# Patient Record
Sex: FEMALE | Race: BLACK OR AFRICAN AMERICAN | NOT HISPANIC OR LATINO | Employment: FULL TIME | URBAN - METROPOLITAN AREA
[De-identification: names, ages, dates, MRNs, and addresses within clinical notes are randomized per-mention and may not be internally consistent; named-entity substitution may affect disease eponyms.]

---

## 2017-05-16 ENCOUNTER — OFFICE VISIT (OUTPATIENT)
Dept: URGENT CARE | Facility: PHYSICIAN GROUP | Age: 22
End: 2017-05-16
Payer: COMMERCIAL

## 2017-05-16 VITALS
HEIGHT: 65 IN | SYSTOLIC BLOOD PRESSURE: 112 MMHG | HEART RATE: 106 BPM | WEIGHT: 142 LBS | BODY MASS INDEX: 23.66 KG/M2 | TEMPERATURE: 100.2 F | RESPIRATION RATE: 16 BRPM | OXYGEN SATURATION: 99 % | DIASTOLIC BLOOD PRESSURE: 72 MMHG

## 2017-05-16 DIAGNOSIS — J02.9 SORE THROAT: ICD-10-CM

## 2017-05-16 DIAGNOSIS — H10.33 ACUTE BACTERIAL CONJUNCTIVITIS OF BOTH EYES: ICD-10-CM

## 2017-05-16 DIAGNOSIS — J01.40 ACUTE PANSINUSITIS, RECURRENCE NOT SPECIFIED: ICD-10-CM

## 2017-05-16 LAB
INT CON NEG: NEGATIVE
INT CON POS: POSITIVE
S PYO AG THROAT QL: NEGATIVE

## 2017-05-16 PROCEDURE — 99214 OFFICE O/P EST MOD 30 MIN: CPT | Performed by: NURSE PRACTITIONER

## 2017-05-16 PROCEDURE — 87880 STREP A ASSAY W/OPTIC: CPT | Performed by: NURSE PRACTITIONER

## 2017-05-16 RX ORDER — AZITHROMYCIN 250 MG/1
TABLET, FILM COATED ORAL
Qty: 6 TAB | Refills: 0 | Status: SHIPPED | OUTPATIENT
Start: 2017-05-16

## 2017-05-16 RX ORDER — TOBRAMYCIN 3 MG/ML
1 SOLUTION/ DROPS OPHTHALMIC 4 TIMES DAILY
Qty: 1 BOTTLE | Refills: 0 | Status: SHIPPED | OUTPATIENT
Start: 2017-05-16

## 2017-05-16 ASSESSMENT — ENCOUNTER SYMPTOMS
DIZZINESS: 0
ORTHOPNEA: 0
HEADACHES: 0
SPUTUM PRODUCTION: 0
VOMITING: 0
FEVER: 1
PHOTOPHOBIA: 0
DOUBLE VISION: 0
NECK PAIN: 0
MYALGIAS: 0
CONSTIPATION: 0
WEAKNESS: 0
SORE THROAT: 1
CHILLS: 0
EYE REDNESS: 1
NAUSEA: 0
EYE DISCHARGE: 1
SHORTNESS OF BREATH: 0
COUGH: 1
BLURRED VISION: 0
ABDOMINAL PAIN: 0
PALPITATIONS: 0
WHEEZING: 0
DIARRHEA: 0
EYE PAIN: 0

## 2017-05-16 NOTE — PROGRESS NOTES
Subjective:      Fabiana Elizondo is a 21 y.o. female who presents with Cough            Cough  Associated symptoms include eye redness, a fever and a sore throat. Pertinent negatives include no chest pain, chills, ear pain, headaches, myalgias, shortness of breath or wheezing. There is no history of environmental allergies.   Fabiana is a 21 year old female who is here for dry cough and sinus problems x 1 week. Taking Dayquil severe cold and congestion at night. Able to function during day with cough but present. Denies SOB, chest tightness, asthma or wheeze. States fever and sore throat have improved. No nasal flushing, nasal sprays or salt water gargling. Admits to poor water drinking. C/o nasal congestion without facial/ear pressure but has thick yellow/green nasal d/c. States both eyes are crusty and itchy and swollen with thick eye d/c.     PMH:  has no past medical history on file.  MEDS:   Current outpatient prescriptions:   •  DM-Phenylephrine-Acetaminophen (VICKS DAYQUIL COLD & FLU PO), Take  by mouth., Disp: , Rfl:   •  Ketotifen Fumarate (ALLERGY EYE DROPS OP), by Ophthalmic route., Disp: , Rfl:   •  tobramycin (TOBREX) 0.3 % Solution ophthalmic solution, Place 1 Drop in both eyes 4 times a day., Disp: 1 Bottle, Rfl: 0  •  azithromycin (ZITHROMAX) 250 MG Tab, Take 2 tabs by mouth on day 1, then take 1 tab on days 2-5, Disp: 6 Tab, Rfl: 0  ALLERGIES: No Known Allergies  SURGHX:   Past Surgical History   Procedure Laterality Date   • Tonsillectomy and adenoidectomy  6/10/08     Performed by STEPHAN LY at SURGERY SAME DAY [facility] ORS     SOCHX:  reports that she has never smoked. She has never used smokeless tobacco. She reports that she does not drink alcohol or use illicit drugs.  FH: Family history was reviewed, no pertinent findings to report      Review of Systems   Constitutional: Positive for fever and malaise/fatigue. Negative for chills.   HENT: Positive for congestion and sore throat.  "Negative for ear pain.    Eyes: Positive for discharge and redness. Negative for blurred vision, double vision, photophobia and pain.   Respiratory: Positive for cough. Negative for sputum production, shortness of breath and wheezing.    Cardiovascular: Negative for chest pain, palpitations and orthopnea.   Gastrointestinal: Negative for nausea, vomiting, abdominal pain, diarrhea and constipation.   Musculoskeletal: Negative for myalgias and neck pain.   Neurological: Negative for dizziness, weakness and headaches.   Endo/Heme/Allergies: Negative for environmental allergies.   All other systems reviewed and are negative.         Objective:     /72 mmHg  Pulse 106  Temp(Src) 37.9 °C (100.2 °F)  Resp 16  Ht 1.651 m (5' 5\")  Wt 64.411 kg (142 lb)  BMI 23.63 kg/m2  SpO2 99%     Physical Exam   Constitutional: She is oriented to person, place, and time. She appears well-developed and well-nourished. She is cooperative.  Non-toxic appearance. She does not have a sickly appearance. She appears ill. No distress.   HENT:   Head: Normocephalic.   Right Ear: External ear and ear canal normal. Tympanic membrane is bulging. A middle ear effusion is present.   Left Ear: External ear and ear canal normal. Tympanic membrane is bulging. A middle ear effusion is present.   Nose: Mucosal edema, rhinorrhea and sinus tenderness present. Right sinus exhibits maxillary sinus tenderness and frontal sinus tenderness. Left sinus exhibits maxillary sinus tenderness and frontal sinus tenderness.   Mouth/Throat: Uvula is midline. Mucous membranes are dry. No uvula swelling. Posterior oropharyngeal erythema present.   Eyes: Conjunctivae and EOM are normal. Pupils are equal, round, and reactive to light.   Neck: Normal range of motion. Neck supple.   Cardiovascular: Normal rate and regular rhythm.    Pulmonary/Chest: Effort normal and breath sounds normal. No accessory muscle usage. No respiratory distress. She has no decreased " breath sounds. She has no wheezes. She has no rhonchi. She has no rales.   Musculoskeletal: Normal range of motion.   Lymphadenopathy:     She has no cervical adenopathy.   Neurological: She is alert and oriented to person, place, and time.   Skin: Skin is warm and dry. She is not diaphoretic.   Vitals reviewed.              Assessment/Plan:     1. Sore throat    - POCT Rapid Strep A: NEG    2. Acute bacterial conjunctivitis of both eyes    - tobramycin (TOBREX) 0.3 % Solution ophthalmic solution; Place 1 Drop in both eyes 4 times a day.  Dispense: 1 Bottle; Refill: 0    3. Acute pansinusitis, recurrence not specified    - azithromycin (ZITHROMAX) 250 MG Tab; Take 2 tabs by mouth on day 1, then take 1 tab on days 2-5  Dispense: 6 Tab; Refill: 0    Increase water intake  Get rest  May use Ibuprofen/Tylenol prn for any fever, body aches or throat pain  May take longer acting antihistamine for seasonal allergy symptoms prn  May use saline nasal spray for nasal congestion  May use Flonase or Nasocort for allergen nasal congestion  May gargle with salt water prn for throat discomfort  May drink smoothies for nutrition if too painful to swallow solid foods  Monitor for productive cough, SOB and chest pain/tightness- need re-evaluation    May use cool compresses for eye swelling  Avoid touching eyes  May clean eyes with mild dilute soap along eyelash line with eyes closed, rinse with plenty of water  Avoid wearing eye makeup until cleared  Monitor for increase in redness or swelling, vision change, eye pain- need re-evaluation

## 2017-05-16 NOTE — MR AVS SNAPSHOT
"Fabiana Elizondo   2017 2:20 PM   Office Visit   MRN: [id number]    Department:  [facility]   Dept Phone:  [phone]    Description:  Female : 1995   Provider:  JOHANA Pardo           Reason for Visit     Cough C/o dry cough, sore throat, fever, bilater eye drainage, nasal/chest congestion congestion x6 days.        Allergies as of 2017     No Known Allergies      You were diagnosed with     Sore throat   [[id number]]       Acute bacterial conjunctivitis of both eyes   [[id number]]       Acute pansinusitis, recurrence not specified   [[id number]]         Vital Signs     Blood Pressure Pulse Temperature Respirations Height Weight    112/72 mmHg 106 37.9 °C (100.2 °F) 16 1.651 m (5' 5\") 64.411 kg (142 lb)    Body Mass Index Oxygen Saturation Smoking Status             23.63 kg/m2 99% Never Smoker          Basic Information     Date Of Birth Sex Race Ethnicity Preferred Language    1995 Female Black or  Non- English      Health Maintenance        Date Due Completion Dates    IMM HPV VACCINE (2 of 3 - Female 3 Dose Series) 2017    IMM DTaP/Tdap/Td Vaccine (7 - Td) 2018, 10/12/2001, 1997, 1997, 1996, 1996            Current Immunizations     Dtap Vaccine 10/12/2001, 1997, 1997, 1996, 1996    HIB Vaccine (ACTHIB/HIBERIX) 1997, 1997, 1996, 1996    HIB Vaccine(PEDVAX) 10/12/2001    HPV 9-VALENT VACCINE (GARDASIL 9) 2016  5:08 PM    Hepatitis A Vaccine, Ped/Adol 2003, 2003    Hepatitis B Vaccine Recombivax (Adol/Adult) 1997, 1996, 1996    IPV 10/12/2001, 1997, 1996, 1996    MMR Vaccine 10/12/2001, 1997    Meningococcal Conjugate Vaccine MCV4 (Menactra) 2016  5:07 PM    Tdap Vaccine 2008    Varicella Vaccine Live 2007, 10/12/2001      Below and/or attached are the medications your provider expects you to take. Review " all of your home medications and newly ordered medications with your provider and/or pharmacist. Follow medication instructions as directed by your provider and/or pharmacist. Please keep your medication list with you and share with your provider. Update the information when medications are discontinued, doses are changed, or new medications (including over-the-counter products) are added; and carry medication information at all times in the event of emergency situations     Allergies:  No Known Allergies          Medications  Valid as of: May 16, 2017 -  3:06 PM    Generic Name Brand Name Tablet Size Instructions for use    Azithromycin (Tab) ZITHROMAX 250 MG Take 2 tabs by mouth on day 1, then take 1 tab on days 2-5        DM-Phenylephrine-Acetaminophen   Take  by mouth.        Ketotifen Fumarate   by Ophthalmic route.        Tobramycin (Solution) TOBREX 0.3 % Place 1 Drop in both eyes 4 times a day.        .                 Medicines prescribed today were sent to:     [facility] [address] [place] [address] [place], [place] - [address] AT [facility]    [address] [place] [address]    Phone: [phone] Fax: [phone]    Open 24 Hours?: No      Medication refill instructions:       If your prescription bottle indicates you have medication refills left, it is not necessary to call your provider’s office. Please contact your pharmacy and they will refill your medication.    If your prescription bottle indicates you do not have any refills left, you may request refills at any time through one of the following ways: The online Parachute system (except Urgent Care), by calling your provider’s office, or by asking your pharmacy to contact your provider’s office with a refill request. Medication refills are processed only during regular business hours and may not be available until the next business day. Your provider may request additional information or to have a follow-up visit with you prior to refilling  your medication.   *Please Note: Medication refills are assigned a new Rx number when refilled electronically. Your pharmacy may indicate that no refills were authorized even though a new prescription for the same medication is available at the pharmacy. Please request the medicine by name with the pharmacy before contacting your provider for a refill.           Zang Access Code: [email or web address]  Expires: 6/7/2017  7:48 AM    Zang  A secure, online tool to manage your health information     [employer]’s Zang® is a secure, online tool that connects you to your personalized health information from the privacy of your home -- day or night - making it very easy for you to manage your healthcare. Once the activation process is completed, you can even access your medical information using the Zang ana, which is available for free in the Apple Ana store or Google Play store.     Zang provides the following levels of access (as shown below):   My Chart Features   [facility] Primary Care Doctor [facility]  Specialists [facility]  Urgent  Care Non-[facility]  Primary Care  Doctor   Email your healthcare team securely and privately 24/7 X X X    Manage appointments: schedule your next appointment; view details of past/upcoming appointments X      Request prescription refills. X      View recent personal medical records, including lab and immunizations X X X X   View health record, including health history, allergies, medications X X X X   Read reports about your outpatient visits, procedures, consult and ER notes X X X X   See your discharge summary, which is a recap of your hospital and/or ER visit that includes your diagnosis, lab results, and care plan. X X       How to register for Zang:  1. Go to  [email or web address].  2. Click on the Sign Up Now box, which takes you to the New Member Sign Up page. You will need to provide the following information:  a. Enter your Zang Access Code exactly as it appears at  the top of this page. (You will not need to use this code after you’ve completed the sign-up process. If you do not sign up before the expiration date, you must request a new code.)   b. Enter your date of birth.   c. Enter your home email address.   d. Click Submit, and follow the next screen’s instructions.  3. Create a Squeakee ID. This will be your Squeakee login ID and cannot be changed, so think of one that is secure and easy to remember.  4. Create a Squeakee password. You can change your password at any time.  5. Enter your Password Reset Question and Answer. This can be used at a later time if you forget your password.   6. Enter your e-mail address. This allows you to receive e-mail notifications when new information is available in Squeakee.  7. Click Sign Up. You can now view your health information.    For assistance activating your Squeakee account, call [phone]

## 2018-01-27 ENCOUNTER — NON-PROVIDER VISIT (OUTPATIENT)
Dept: URGENT CARE | Facility: PHYSICIAN GROUP | Age: 23
End: 2018-01-27

## 2018-01-27 DIAGNOSIS — Z11.1 SPECIAL SCREENING EXAMINATION FOR RESPIRATORY TUBERCULOSIS: ICD-10-CM

## 2018-01-27 PROCEDURE — 86580 TB INTRADERMAL TEST: CPT | Performed by: PHYSICIAN ASSISTANT

## 2018-01-29 ENCOUNTER — NON-PROVIDER VISIT (OUTPATIENT)
Dept: URGENT CARE | Facility: PHYSICIAN GROUP | Age: 23
End: 2018-01-29

## 2018-01-29 DIAGNOSIS — Z11.1 ENCOUNTER FOR PPD SKIN TEST READING: ICD-10-CM

## 2018-01-29 LAB — TB WHEAL 3D P 5 TU DIAM: 0 MM

## 2018-01-30 NOTE — PROGRESS NOTES
Fabiana Elizondo is a 22 y.o. female here for a non-provider visit for PPD reading -- Step 1 of 1.      1.  Resulted in Epic under enter/edit results? Yes   2.  TB evaluation questionnaire scanned into chart and original given to patient?Yes      3. Was induration greater than 0 mm? No.        Routed to PCP? Yes

## 2019-07-13 ENCOUNTER — OFFICE VISIT (OUTPATIENT)
Dept: URGENT CARE | Facility: PHYSICIAN GROUP | Age: 24
End: 2019-07-13
Payer: COMMERCIAL

## 2019-07-13 VITALS
WEIGHT: 168.2 LBS | BODY MASS INDEX: 27.03 KG/M2 | DIASTOLIC BLOOD PRESSURE: 76 MMHG | SYSTOLIC BLOOD PRESSURE: 118 MMHG | HEIGHT: 66 IN | TEMPERATURE: 99.1 F | HEART RATE: 104 BPM | OXYGEN SATURATION: 93 %

## 2019-07-13 DIAGNOSIS — S81.811A LACERATION OF RIGHT LOWER LEG, INITIAL ENCOUNTER: ICD-10-CM

## 2019-07-13 PROCEDURE — 99213 OFFICE O/P EST LOW 20 MIN: CPT | Performed by: FAMILY MEDICINE

## 2019-07-13 ASSESSMENT — ENCOUNTER SYMPTOMS
FEVER: 0
SHORTNESS OF BREATH: 0
ROS SKIN COMMENTS: +LACERATION
VOMITING: 0

## 2019-07-13 NOTE — PROGRESS NOTES
"Subjective:     Fabiana Hunt is a 24 y.o. female who presents for Laceration (R leg laceration, pt was taking out the trash and a justin jar cut her leg)    HPI  Pt presents for evaluation of a new problem   Patient is a 24-year-old female with laceration on right outer calf  Sustained after a broken piece of glass cut her leg   Able to stop bleeding on her own  No foreign object or dirt in wound     Review of Systems   Constitutional: Negative for fever.   Respiratory: Negative for shortness of breath.    Cardiovascular: Negative for chest pain.   Gastrointestinal: Negative for vomiting.   Skin:        +Laceration      PMH: No chronic medical problems, no history of keloid formation  MEDS:   Current Outpatient Prescriptions:   •  DM-Phenylephrine-Acetaminophen (VICKS DAYQUIL COLD & FLU PO), Take  by mouth., Disp: , Rfl:   •  Ketotifen Fumarate (ALLERGY EYE DROPS OP), by Ophthalmic route., Disp: , Rfl:   •  tobramycin (TOBREX) 0.3 % Solution ophthalmic solution, Place 1 Drop in both eyes 4 times a day., Disp: 1 Bottle, Rfl: 0  •  azithromycin (ZITHROMAX) 250 MG Tab, Take 2 tabs by mouth on day 1, then take 1 tab on days 2-5, Disp: 6 Tab, Rfl: 0  ALLERGIES: No Known Allergies  SURGHX:   Past Surgical History:   Procedure Laterality Date   • TONSILLECTOMY AND ADENOIDECTOMY  6/10/08    Performed by STEPHAN LY at SURGERY SAME DAY [facility] ORS     SOCHX:  reports that she has never smoked. She has never used smokeless tobacco. She reports that she does not drink alcohol or use drugs.  FH: Family history was reviewed, not contributing to acute illness     Objective:   /76 (BP Location: Left arm, Patient Position: Sitting, BP Cuff Size: Adult)   Pulse (!) 104   Temp 37.3 °C (99.1 °F) (Temporal)   Ht 1.676 m (5' 6\")   Wt 76.3 kg (168 lb 3.2 oz)   SpO2 93%   BMI 27.15 kg/m²     Physical Exam   Constitutional: She is oriented to person, place, and time. She appears well-developed and well-nourished. No " distress.   HENT:   Head: Normocephalic and atraumatic.   Neurological: She is alert and oriented to person, place, and time.   Skin: Skin is warm and dry. She is not diaphoretic.   3cm straight laceration over the right lateral calf which is shallow and with subcutaneous fat visible.  No deeper muscles or structures visible   Psychiatric: She has a normal mood and affect. Her behavior is normal. Judgment and thought content normal.     Assessment/Plan:   Assessment    1. Laceration of right lower leg, initial encounter  Patient is a 24-year-old female with laceration to right lower leg.  Laceration is straight and easily approximated.  Advised that this laceration could be treated with Steri-Strips and Dermabond or repair with stitches.  Patient prefers Steri-Strips and Dermabond.  Wound well approximated with Steri-Strips and given postprocedure precautions as well as reviewed signs and symptoms of infection.  Patient will follow-up on an as-needed basis.

## 2020-01-26 ENCOUNTER — NON-PROVIDER VISIT (OUTPATIENT)
Dept: URGENT CARE | Facility: PHYSICIAN GROUP | Age: 25
End: 2020-01-26

## 2020-01-26 DIAGNOSIS — Z11.1 PPD SCREENING TEST: ICD-10-CM

## 2020-01-26 PROCEDURE — 86580 TB INTRADERMAL TEST: CPT | Performed by: NURSE PRACTITIONER

## 2020-01-27 NOTE — PROGRESS NOTES
Subjective:      Fabiana Hunt is a 24 y.o. female who presents with PPD Placement            Fabiana Hunt is a 24 y.o. female here for a non-provider visit for PPD placement -- Step 1 of 1    Reason for PPD:  work requirement    1. TB evaluation questionnaire completed by patient? Yes      -  If any answers marked yes did you contact a provider prior to placing? Not Indicated  2.  Patient notified to return to clinic for reading on: 1/28/20 after 11:46 am or 1/29/20 before 11:46 am   3.  PPD Placement documentation completed on TB evaluation questionnaire? Yes  4.  Location of TB evaluation questionnaire filed:          [facility]       Objective:     There were no vitals taken for this visit.     Physical Exam            Assessment/Plan:       There are no diagnoses linked to this encounter.

## 2020-01-28 ENCOUNTER — NON-PROVIDER VISIT (OUTPATIENT)
Dept: URGENT CARE | Facility: PHYSICIAN GROUP | Age: 25
End: 2020-01-28

## 2020-01-28 LAB — TB WHEAL 3D P 5 TU DIAM: NORMAL MM

## 2020-01-29 NOTE — NON-PROVIDER
Fabiana Hunt is a 24 y.o. female here for a non-provider visit for PPD reading -- Step 1 of 1.      1.  Resulted in Epic under enter/edit results? Yes   2.  TB evaluation questionnaire scanned into chart and original given to patient?Yes      3. Was induration greater than 0 mm? No.    If Step 1 of 2, when is patient returning for second step (delete if N/A): N/A     Routed to PCP? No